# Patient Record
Sex: FEMALE | Race: OTHER | Employment: FULL TIME | URBAN - METROPOLITAN AREA
[De-identification: names, ages, dates, MRNs, and addresses within clinical notes are randomized per-mention and may not be internally consistent; named-entity substitution may affect disease eponyms.]

---

## 2021-02-24 ENCOUNTER — APPOINTMENT (OUTPATIENT)
Dept: PHYSICAL THERAPY | Age: 41
End: 2021-02-24
Payer: OTHER GOVERNMENT

## 2021-02-24 ENCOUNTER — HOSPITAL ENCOUNTER (OUTPATIENT)
Dept: PHYSICAL THERAPY | Age: 41
Discharge: HOME | End: 2021-02-24
Payer: OTHER GOVERNMENT

## 2021-02-24 PROCEDURE — 97530 THERAPEUTIC ACTIVITIES: CPT

## 2021-02-24 PROCEDURE — 97110 THERAPEUTIC EXERCISES: CPT

## 2021-02-24 PROCEDURE — 97161 PT EVAL LOW COMPLEX 20 MIN: CPT

## 2021-02-24 NOTE — PROGRESS NOTES
[facility]  [address]  [place], [address]  [phone] fax    Plan of Care/ Statement of Necessity for Physical Therapy Services    Patient name: Hung Lopez Start of Care: 2021   Referral source: Alan Hoang NP : 1980    Medical Diagnosis: Low back pain [M54.5]  Payor: ARON / Plan: Holger Gibson / Product Type: Sendy Cambric /  Onset Date:2021    Treatment Diagnosis: low back pain    Prior Hospitalization: see medical history Provider#: [id number]   Medications: Verified on Patient summary List    Comorbidities: allergies, back pain    Prior Level of Function: no limitations, working out on bike 3x/week      The Plan of Care and following information is based on the information from the initial evaluation. Assessment/ key information: Patient is a 47year old female who injured her low back (left > right) when lifting a box with poor mechanics on 2021. She reports no limitations prior to onset of injury and working out on her bike ~3x/week. Patient currently limited with prolonged positioning (sitting > standing) impacting work and household activities, reports difficulty with sleeping, and is fearful with performance of usual activities in fear of flaring her back up. Patient demonstrates decreased single leg stance balance, decreased left hip strength, decreased flexibility through low back (left > right), limited lifting mechanics, and decreased core stabilization impacting functional mobility. Patient would benefit from skilled PT 2x/week for 4 weeks to address above impairments and promote return to prior level of function.      Evaluation Complexity History MEDIUM  Complexity : 1-2 comorbidities / personal factors will impact the outcome/ POC ; Examination MEDIUM Complexity : 3 Standardized tests and measures addressing body structure, function, activity limitation and / or participation in recreation ;Presentation LOW Complexity : Stable, uncomplicated  ;Clinical Decision Making MEDIUM Complexity : FOTO score of 26-74  Overall Complexity Rating: LOW   Problem List: pain affecting function, decrease ROM, decrease strength, impaired gait/ balance, decrease ADL/ functional abilitiies, decrease activity tolerance, decrease flexibility/ joint mobility and decrease transfer abilities   Treatment Plan may include any combination of the following: Therapeutic exercise, Therapeutic activities, Neuromuscular re-education, Physical agent/modality, Gait/balance training, Manual therapy, Patient education, Self Care training, Functional mobility training, Home safety training and Stair training  Patient / Family readiness to learn indicated by: asking questions, trying to perform skills and interest  Persons(s) to be included in education: patient (P)  Barriers to Learning/Limitations: None  Patient Goal (s): “feel 100% again and not feel fearful of movement”  Patient Self Reported Health Status: good  Rehabilitation Potential: good    Short Term Goals: To be accomplished in 2 weeks:  1. Patient will report performance of home exercise program 4 of 7 days in the next week demonstrating compliance to therapy program and progress towards independent management of symptoms.  2. Patient will report no hesitations with transfers or quick change in positions associated with work or daily activities to improve functional mobility.     Long Term Goals: To be accomplished in 4 weeks:  1. Patient will demonstrate proper lifting mechanics without VC or hesitation for a 20# box to return to prior level of function and full work duties.   2. Patient will report at least 75% improvement in overall symptoms to improve overall quality of life and allow patient to return to recreational activities.   3. Patient will increase left hip strength globally to 5/5 for improved stair negation, improved functional mobility, and improved ability to  return to exercise program.   4. Patient will improve FOTO score to 74 to demonstrate return to prior level of function and to improve patients ability to perform household and work duties with greater ease. Frequency / Duration: Patient to be seen 1-2 times per week for 4 weeks. Patient/ Caregiver education and instruction: Diagnosis, prognosis, self care, activity modification, exercises and other hot/cold pack application for symptom management   [x]  Plan of care has been reviewed with ABDIRAHMAN Vaca PT, DPT 2/24/2021 12:56 PM    ________________________________________________________________________    I certify that the above Therapy Services are being furnished while the patient is under my care. I agree with the treatment plan and certify that this therapy is necessary.     [de-identified] Signature:____________Date:_________TIME:________     [facility], Not On File, MD  ** Signature, Date and Time must be completed for valid certification **    Please sign and return to [facility] [address]  [address]  [place], [address]  [phone] fax

## 2021-02-24 NOTE — PROGRESS NOTES
PT DAILY TREATMENT NOTE     Patient Name: Mert Christian  Date:2021  : 1980  [x]  Patient  Verified  Payor: ARON / Plan: Be Bustam[address] / Product Type: Berl Milbridge /    In time:1203  Out time:1241  Total Treatment Time (min):38  Visit #: 1 of -8     Treatment Area: Low back pain [M54.5]    SUBJECTIVE  Pain Level (0-10 scale): 1-2  Any medication changes, allergies to medications, adverse drug reactions, diagnosis change, or new procedure performed?: [x] No    [] Yes (see summary sheet for update)  Subjective functional status/changes:   [] No changes reported  Patient reports lifting a box of food on 2021 that she knew wasn't heavy so she bent over and lifted with her back. She reports immediate discomfort in the low back with severe pain onset by that evening. The following 2 days she had difficulty getting in/out of bed and had severe pain. She reports some improvements since onset with use of medication, ice, and frequent change in positions. Patient reports no limitations prior to onset of injury in February. OBJECTIVE    13 min [x]Eval                  []Re-Eval       15 min Therapeutic Exercise:  [x] See flow sheet: Patient provided printed HEP to begin addressing impairments. Patient provided demonstration of exercises and rationale for each exercise to improve compliance to HEP. Education provided on importance of compliance to HEP for improved carry over between therapy session. Rationale: increase ROM, increase strength and improve coordination to improve the patients ability to perform ADLs and functional mobility with greater ease     10 min Therapeutic Activity:  [x]  See flow sheet: Discussed pathology, findings, and treatment plan of care. Education provided on use of log roll for improved bed mobility to minimize discomfort on low back. Patient education provided on use of hot/cold pack application for symptoms  management.  Education also provided on proper lifting techniques/mechanics to minimize undue distress on low back. Rationale: improve coordination  to improve the patients ability to perform functional mobility with greater ease and improve sleep tolerance            With   [] TE   [] TA   [] neuro   [] other: Patient Education: [x] Review HEP    [] Progressed/Changed HEP based on:   [] positioning   [] body mechanics   [] transfers   [] heat/ice application    [] other:      Other Objective/Functional Measures: see paper chart for evaluation form     Pain Level (0-10 scale) post treatment: 1-2    ASSESSMENT/Changes in Function: Patient is a 47year old female who injured her low back (left > right) when lifting a box with poor mechanics on 2/4/2021. She reports no limitations prior to onset of injury and working out on her bike ~3x/week. Patient currently limited with prolonged positioning (sitting > standing) impacting work and household activities, reports difficulty with sleeping, and is fearful with performance of usual activities in fear of flaring her back up. Patient demonstrates decreased single leg stance balance, decreased left hip strength, decreased flexibility through low back (left > right), limited lifting mechanics, and decreased core stabilization impacting functional mobility. Patient would benefit from skilled PT 2x/week for 4 weeks to address above impairments and promote return to prior level of function. Patient will continue to benefit from skilled PT services to modify and progress therapeutic interventions, address functional mobility deficits, address ROM deficits, address strength deficits, analyze and address soft tissue restrictions, analyze and cue movement patterns, analyze and modify body mechanics/ergonomics, assess and modify postural abnormalities, address imbalance/dizziness and instruct in home and community integration to attain remaining goals.      [x]  See Plan of Care  []  See progress note/recertification  [] See Discharge Summary         Progress towards goals / Updated goals:  Short Term Goals: To be accomplished in 2 weeks:  1. Patient will report performance of home exercise program 4 of 7 days in the next week demonstrating compliance to therapy program and progress towards independent management of symptoms. Eval: HEP provided   2. Patient will report no hesitations with transfers or quick change in positions associated with work or daily activities to improve functional mobility. Eval: hesitates with transfers and fearful of movement    Long Term Goals: To be accomplished in 4 weeks:  1. Patient will demonstrate proper lifting mechanics without VC or hesitation for a 20# box to return to prior level of function and full work duties. Eval: 15# box with mini squat and lifting with back slightly   2. Patient will report at least 75% improvement in overall symptoms to improve overall quality of life and allow patient to return to recreational activities. Eval: 0%   3. Patient will increase left hip strength globally to 5/5 for improved stair negation, improved functional mobility, and improved ability to return to exercise program.    Eval: left hip flexion strength 4+/5, left hip abduction strength 4+/5, left hip extension strength 3+/5 with pain   4. Patient will improve FOTO score to 74 to demonstrate return to prior level of function and to improve patients ability to perform household and work duties with greater ease.    Eval: 64    PLAN  [x]  Upgrade activities as tolerated     []  Continue plan of care  []  Update interventions per flow sheet       []  Discharge due to:_  []  Other:_      Chris Conner, PT, DPT 2/24/2021  12:56 PM    Future Appointments   Date Time Provider Melvin Steiner   3/2/2021 12:00 PM [facility]   3/4/2021  1:00 PM [facility]in[facility]   3/9/2021 12:00 PM [facility]ba[facility]   3/11/2021  1:00 PM Cresencio Rod C, PTA [facility]   3/16/2021 12:00 PM Varghese Leon, PTA [facility]   3/18/2021  1:00 PM Mary Jo Davis, PTA [facility]   3/23/2021 12:00 PM Julián Ricketts [facility]

## 2021-03-02 ENCOUNTER — HOSPITAL ENCOUNTER (OUTPATIENT)
Dept: PHYSICAL THERAPY | Age: 41
Discharge: HOME | End: 2021-03-02
Payer: OTHER GOVERNMENT

## 2021-03-02 PROCEDURE — 97110 THERAPEUTIC EXERCISES: CPT

## 2021-03-02 PROCEDURE — 97112 NEUROMUSCULAR REEDUCATION: CPT

## 2021-03-02 PROCEDURE — 97140 MANUAL THERAPY 1/> REGIONS: CPT

## 2021-03-02 NOTE — PROGRESS NOTES
PT DAILY TREATMENT NOTE     Patient Name: Nabila Joshi  Date:3/2/2021  : 1980  [x]  Patient  Verified  Payor:  / Plan: [facility]  [facility]ION / Product Type:  /    In time:11:56  Out time:12:49  Total Treatment Time (min): 48  Visit #: 2 of 4-8    Treatment Area: Low back pain [M54.5]    SUBJECTIVE  Pain Level (0-10 scale): 2/10  Any medication changes, allergies to medications, adverse drug reactions, diagnosis change, or new procedure performed?: [x] No    [] Yes (see summary sheet for update)  Subjective functional status/changes:   [] No changes reported  \"Been feeling better. I can tell I'm not 100% while I'm doing the stretches. \"    OBJECTIVE    20 min Therapeutic Exercise:  [x] See flow sheet :   Rationale: increase ROM and increase strength to improve the patients ability to perform ADL's.     25 min Neuromuscular Re-education:  [x]  See flow sheet : RDL's with TRX, reformer series, CoreAlign series, shuttle balance squats. Rationale: increase strength, improve coordination and increase proprioception  to improve the patients ability to perform functional activities. 8 min Manual Therapy:  T/S PA mobs, rib springs grade III. STM/DTM Left QL, L/S paraspinals and piriformis. MFR Left low back musculature. Pt prone. The manual therapy interventions were performed at a separate and distinct time from the therapeutic activities interventions. Rationale: decrease pain, increase ROM, increase tissue extensibility and decrease trigger points to improve ease of performing functional activities. With   [x] TE   [] TA   [] neuro   [] other: Patient Education: [x] Review HEP    [] Progressed/Changed HEP based on:   [] positioning   [] body mechanics   [] transfers   [] heat/ice application    [] other:      Other Objective/Functional Measures: Initiated exercises per flow sheet. Cueing and demonstration for proper exercise mechanics.  2 Cavitations with PA mobs and rib mitesh. Pain Level (0-10 scale) post treatment: 0/10    ASSESSMENT/Changes in Function: First F/U visit. Pt fully participated in treatment. Left LE weaker than Right, pt reports history of Left knee pain. Pt denied pain low back, expressed feeling fatigued/tired post-treatment. Continue PT to increase core and (B) hip strength to improve ease of performing functional activities. Patient will continue to benefit from skilled PT services to modify and progress therapeutic interventions, address functional mobility deficits, address ROM deficits, address strength deficits, analyze and address soft tissue restrictions, analyze and cue movement patterns and analyze and modify body mechanics/ergonomics to attain remaining goals. [x]  See Plan of Care  []  See progress note/recertification  []  See Discharge Summary         Progress towards goals / Updated goals:  Short Term Goals: To be accomplished in 2 weeks:  1. Patient will report performance of home exercise program 4 of 7 days in the next week demonstrating compliance to therapy program and progress towards independent management of symptoms. Eval: HEP provided   Current: Met, reports compliance. 3/2/2021  2. Patient will report no hesitations with transfers or quick change in positions associated with work or daily activities to improve functional mobility. Eval: hesitates with transfers and fearful of movement     Long Term Goals: To be accomplished in 4 weeks:  1. Patient will demonstrate proper lifting mechanics without VC or hesitation for a 20# box to return to prior level of function and full work duties. Eval: 15# box with mini squat and lifting with back slightly   2. Patient will report at least 75% improvement in overall symptoms to improve overall quality of life and allow patient to return to recreational activities. Eval: 0%   3.  Patient will increase left hip strength globally to 5/5 for improved stair negation, improved functional mobility, and improved ability to return to exercise program.               Eval: left hip flexion strength 4+/5, left hip abduction strength 4+/5, left hip extension strength 3+/5 with pain   4. Patient will improve FOTO score to 74 to demonstrate return to prior level of function and to improve patients ability to perform household and work duties with greater ease.               Eval: 56    PLAN  []  Upgrade activities as tolerated     [x]  Continue plan of care  []  Update interventions per flow sheet       []  Discharge due to:_  []  Other:_      Antonio Sullivan PTA 3/2/2021  12:07 PM    Future Appointments   Date Time Provider Melvin Steiner   3/4/2021  1:00 PM Magaly Butler PTA [facility]   3/9/2021 12:00 PM Magaly Butler PTA [facility]   3/11/2021  1:00 PM Jimbo Quesada PTA [facility]   3/16/2021 12:00 PM Jimbo Quesada PTA [facility]   3/18/2021  1:00 PM Magaly Butler PTA [facility]   3/23/2021 12:00 PM Inga Ambrose [facility]

## 2021-03-04 ENCOUNTER — HOSPITAL ENCOUNTER (OUTPATIENT)
Dept: PHYSICAL THERAPY | Age: 41
Discharge: HOME | End: 2021-03-04
Payer: OTHER GOVERNMENT

## 2021-03-04 PROCEDURE — 97110 THERAPEUTIC EXERCISES: CPT

## 2021-03-04 PROCEDURE — 97140 MANUAL THERAPY 1/> REGIONS: CPT

## 2021-03-04 PROCEDURE — 97112 NEUROMUSCULAR REEDUCATION: CPT

## 2021-03-04 NOTE — PROGRESS NOTES
PT DAILY TREATMENT NOTE     Patient Name: Arash Head  Date:3/4/2021  : 1980  [x]  Patient  Verified  Payor:  / Plan: [facility] / Product Type:  /    In time:1:00  Out time:1:57  Total Treatment Time (min): 62  Visit #: 3 of 4-8      Treatment Area: Low back pain [M54.5]    SUBJECTIVE  Pain Level (0-10 scale): 0  Any medication changes, allergies to medications, adverse drug reactions, diagnosis change, or new procedure performed?: [x] No    [] Yes (see summary sheet for update)  Subjective functional status/changes:   [] No changes reported  Pt reports she has no pain in her low back today but is a little sore from her last session. OBJECTIVE    Modality rationale: decrease pain and increase tissue extensibility to improve the patients ability to perform daily task with ease.    Min Type Additional Details    [] Estim:  []Unatt       []IFC  []Premod                        []Other:  []w/ice   []w/heat  Position:  Location:    [] Estim: []Att    []TENS instruct  []NMES                    []Other:  []w/US   []w/ice   []w/heat  Position:  Location:    []  Traction: [] Cervical       []Lumbar                       [] Prone          []Supine                       []Intermittent   []Continuous Lbs:  [] before manual  [] after manual    []  Ultrasound: []Continuous   [] Pulsed                           []1MHz   []3MHz W/cm2:  Location:    []  Iontophoresis with dexamethasone         Location: [] Take home patch   [] In clinic   10 []  Ice     [x]  heat  []  Ice massage  []  Laser   []  Anodyne Position: supine w/wedge  Location:    []  Laser with stim  []  Other:  Position:  Location:    []  Vasopneumatic Device Pressure:       [] lo [] med [] hi   Temperature: [] lo [] med [] hi   [] Skin assessment post-treatment:  []intact []redness- no adverse reaction    []redness  adverse reaction:       15 min Therapeutic Exercise:  [x] See flow sheet :   Rationale: increase ROM, increase strength and improve coordination to improve the patients ability to perform daily task with ease. 24 min Neuromuscular Re-education:  [x]  See flow sheet : core align, shuttle balance   Rationale: increase strength, improve coordination, improve balance and increase proprioception  to improve the patients ability to perform functional task with ease. 8 min Manual Therapy:  T/S PA mobs, rib springs grade III. STM/DTM Left QL, L/S paraspinals and piriformis. MFR Left low back musculature. Pt prone. The manual therapy interventions were performed at a separate and distinct time from the therapeutic activities interventions. Rationale: decrease pain, increase ROM, increase tissue extensibility and decrease trigger points to perform            With   [] TE   [] TA   [] neuro   [] other: Patient Education: [x] Review HEP    [] Progressed/Changed HEP based on:   [] positioning   [] body mechanics   [] transfers   [] heat/ice application    [] other:      Other Objective/Functional Measures:      Pain Level (0-10 scale) post treatment: 0    ASSESSMENT/Changes in Function:   Improved RDL mechanics with the TRX bands after correction however pt does start to regress in form with fatigue. Increased lordosis noted throughout exercises with pt noting a minor discomfort in the low back that prompted an emphasis on PPT mechanics in supine and in standing to help decrease lordosis with exercises. Pt notes improved comfort in the L/S with PPT while performing standing exercises especially noted with exercises on the core align. Pt reports good relief with manual and notes no pain post treatment.      Patient will continue to benefit from skilled PT services to modify and progress therapeutic interventions, address functional mobility deficits, address ROM deficits, address strength deficits, analyze and address soft tissue restrictions, analyze and cue movement patterns, analyze and modify body mechanics/ergonomics and assess and modify postural abnormalities to attain remaining goals. [x]  See Plan of Care  []  See progress note/recertification  []  See Discharge Summary         Progress towards goals / Updated goals:  Short Term Goals: To be accomplished in 2 weeks:  1. Patient will report performance of home exercise program 4 of 7 days in the next week demonstrating compliance to therapy program and progress towards independent management of symptoms.             Eval: HEP provided              Current: Met, reports compliance. 3/2/2021  2. Patient will report no hesitations with transfers or quick change in positions associated with work or daily activities to improve functional mobility.               Eval: hesitates with transfers and fearful of movement     Long Term Goals: To be accomplished in 4 weeks:  1. Patient will demonstrate proper lifting mechanics without VC or hesitation for a 20# box to return to prior level of function and full work duties.                Eval: 15# box with mini squat and lifting with back slightly   2. Patient will report at least 75% improvement in overall symptoms to improve overall quality of life and allow patient to return to recreational activities.                Eval: 0%   3. Patient will increase left hip strength globally to 5/5 for improved stair negation, improved functional mobility, and improved ability to return to exercise program.               Eval: left hip flexion strength 4+/5, left hip abduction strength 4+/5, left hip extension strength 3+/5 with pain   4.  Patient will improve FOTO score to 74 to demonstrate return to prior level of function and to improve patients ability to perform household and work duties with greater ease.              Eval: 56    PLAN  []  Upgrade activities as tolerated     [x]  Continue plan of care  []  Update interventions per flow sheet       []  Discharge due to:_  []  Other:_      Sarah Herzog, PTA 3/4/2021  12:55 PM    Future Appointments   Date Time Provider Melvin Steiner   3/4/2021  1:00 PM Lisa Dhillon, ABDIRAHMAN [facility]   3/9/2021 12:00 PM Lisa Dhillon, PTA [facility]   3/11/2021  1:00 PM Kitty Hernandez, PTA [facility]   3/16/2021 12:00 PM Kitty Hernandez, PTA [facility]   3/18/2021  1:00 PM Lisa Dhillon, PTA [facility]   3/23/2021 12:00 PM Leticia Flores [facility]

## 2021-03-09 ENCOUNTER — HOSPITAL ENCOUNTER (OUTPATIENT)
Dept: PHYSICAL THERAPY | Age: 41
Discharge: HOME | End: 2021-03-09
Payer: OTHER GOVERNMENT

## 2021-03-09 PROCEDURE — 97110 THERAPEUTIC EXERCISES: CPT

## 2021-03-09 PROCEDURE — 97112 NEUROMUSCULAR REEDUCATION: CPT

## 2021-03-09 PROCEDURE — 97140 MANUAL THERAPY 1/> REGIONS: CPT

## 2021-03-09 NOTE — PROGRESS NOTES
PT DAILY TREATMENT NOTE     Patient Name: Candelaria Zhou  Date:3/9/2021  : 1980  [x]  Patient  Verified  Payor:  / Plan: [facility] REGION / Product Type:  /    In time:12:01  Out time:1:46  Total Treatment Time (min): 45  Visit #: 4 of 8    Medicare/BCBS Only   Total Timed Codes (min):  45 1:1 Treatment Time:  45       Treatment Area: Low back pain [M54.5]    SUBJECTIVE  Pain Level (0-10 scale): 0/10  Any medication changes, allergies to medications, adverse drug reactions, diagnosis change, or new procedure performed?: [x] No    [] Yes (see summary sheet for update)  Subjective functional status/changes:   [] No changes reported  The patient denies pain upon arrival and states she feels like she has been babying her back. OBJECTIVE  22 min Therapeutic Exercise:  [x] See flow sheet :   Rationale: increase ROM and increase strength to improve the patients ability to improve ADL ease. 15 min Neuromuscular Re-education:  []  See flow sheet :   Rationale: increase ROM and increase strength  to improve the patients ability to improve ADL ease. 8 min Manual Therapy:  MFR of left QL and lumbar paraspinal with the patient in prone. The manual therapy interventions were performed at a separate and distinct time from the therapeutic activities interventions. Rationale: decrease pain, increase ROM and increase tissue extensibility to improve ADL ease. With   [] TE   [] TA   [] neuro   [] other: Patient Education: [x] Review HEP    [] Progressed/Changed HEP based on:   [] positioning   [] body mechanics   [] transfers   [] heat/ice application    [] other:      Other Objective/Functional Measures:   KB squat lifts 10# with good form x 12 to floor, but elevated with 4\" box. Pain Level (0-10 scale) post treatment: 3/10    ASSESSMENT/Changes in Function: The patient was able to complete exercises. She was challenged by progressions;  Able to maintain spinal neutral through interventions and left at a 3/10, but stated her back felt \"tired, but it's good, I know I need it. \"     Patient will continue to benefit from skilled PT services to modify and progress therapeutic interventions, address functional mobility deficits, address ROM deficits, address strength deficits, analyze and address soft tissue restrictions, analyze and cue movement patterns, analyze and modify body mechanics/ergonomics, assess and modify postural abnormalities and instruct in home and community integration to attain remaining goals. []  See Plan of Care  []  See progress note/recertification  []  See Discharge Summary         Progress towards goals / Updated goals:  Short Term Goals: To be accomplished in 2 weeks:  1. Patient will report performance of home exercise program 4 of 7 days in the next week demonstrating compliance to therapy program and progress towards independent management of symptoms.             Eval: HEP provided              IIPQNXU: Met, reports compliance. 3/2/2021  2. Patient will report no hesitations with transfers or quick change in positions associated with work or daily activities to improve functional mobility.               Eval: hesitates with transfers and fearful of movement     Long Term Goals: To be accomplished in 4 weeks:  1. Patient will demonstrate proper lifting mechanics without VC or hesitation for a 20# box to return to prior level of function and full work duties.                Eval: 15# box with mini squat and lifting with back slightly    Current: 15\" from floor on 4\" step 3/09/2021  2. Patient will report at least 75% improvement in overall symptoms to improve overall quality of life and allow patient to return to recreational activities.                Eval: 0%   3.  Patient will increase left hip strength globally to 5/5 for improved stair negation, improved functional mobility, and improved ability to return to exercise program.               Eval: left hip flexion strength 4+/5, left hip abduction strength 4+/5, left hip extension strength 3+/5 with pain   4.  Patient will improve FOTO score to 74 to demonstrate return to prior level of function and to improve patients ability to perform household and work duties with greater ease.              Eval: 56    PLAN  []  Upgrade activities as tolerated     [x]  Continue plan of care  []  Update interventions per flow sheet       []  Discharge due to:_  []  Other:_       Rajendra Jesus, PT 3/9/2021  12:19 PM    Future Appointments   Date Time Provider Melvin Steiner   3/11/2021  1:00 PM Jacquelin Dave PTA [facility]   3/16/2021 12:00 PM Jacquelin Dave PTA [facility]   3/18/2021  1:00 PM Tee Trujillo PTA [facility]   3/23/2021 12:00 PM Mark Brandon [facility]

## 2021-03-11 ENCOUNTER — HOSPITAL ENCOUNTER (OUTPATIENT)
Dept: PHYSICAL THERAPY | Age: 41
Discharge: HOME | End: 2021-03-11
Payer: OTHER GOVERNMENT

## 2021-03-11 PROCEDURE — 97140 MANUAL THERAPY 1/> REGIONS: CPT

## 2021-03-11 PROCEDURE — 97110 THERAPEUTIC EXERCISES: CPT

## 2021-03-11 PROCEDURE — 97112 NEUROMUSCULAR REEDUCATION: CPT

## 2021-03-11 NOTE — PROGRESS NOTES
PT DAILY TREATMENT NOTE     Patient Name: Mari Azul  Date:3/11/2021  : 1980  [x]  Patient  Verified  Payor:  / Plan: [facility] REGION / Product Type:  /    In time:12:58  Out time:1:55  Total Treatment Time (min): 62  Visit #: 5 of 8    Treatment Area: Low back pain [M54.5]    SUBJECTIVE  Pain Level (0-10 scale): 2/10  Any medication changes, allergies to medications, adverse drug reactions, diagnosis change, or new procedure performed?: [x] No    [] Yes (see summary sheet for update)  Subjective functional status/changes:   [] No changes reported  \"Just a little sore but no pain. \"    OBJECTIVE    Modality rationale: decrease pain and increase tissue extensibility to improve the patients ability to perform ADL's.    Min Type Additional Details    [] Estim:  []Unatt       []IFC  []Premod                        []Other:  []w/ice   []w/heat  Position:  Location:    [] Estim: []Att    []TENS instruct  []NMES                    []Other:  []w/US   []w/ice   []w/heat  Position:  Location:    []  Traction: [] Cervical       []Lumbar                       [] Prone          []Supine                       []Intermittent   []Continuous Lbs:  [] before manual  [] after manual    []  Ultrasound: []Continuous   [] Pulsed                           []1MHz   []3MHz W/cm2:  Location:    []  Iontophoresis with dexamethasone         Location: [] Take home patch   [] In clinic   10 []  Ice     [x]  heat  []  Ice massage  []  Laser   []  Anodyne Position: Supine with wedge  Location: L/S    []  Laser with stim  []  Other:  Position:  Location:    []  Vasopneumatic Device Pressure:       [] lo [] med [] hi   Temperature: [] lo [] med [] hi   [] Skin assessment post-treatment:  []intact []redness- no adverse reaction    []redness  adverse reaction:     10 min Therapeutic Exercise:  [x] See flow sheet :   Rationale: increase ROM and increase strength to improve the patients ability to perform ADL's.    29 min Neuromuscular Re-education:  [x]  See flow sheet : RDL's with TRX, reformer series, CoreAlign series, shuttle balance squats. Rationale: increase strength, improve coordination and increase proprioception  to improve the patients ability to perform functional activities. 8 min Manual Therapy:  T/S PA mobs, rib springs grade III. STM/DTM Left QL, L/S paraspinals and piriformis. MFR Left low back musculature. Pt prone. The manual therapy interventions were performed at a separate and distinct time from the therapeutic activities interventions. Rationale: decrease pain, increase ROM, increase tissue extensibility and decrease trigger points to improve ease of performing functional activities. With   [x] TE   [] TA   [] neuro   [] other: Patient Education: [x] Review HEP    [] Progressed/Changed HEP based on:   [] positioning   [] body mechanics   [] transfers   [] heat/ice application    [] other:      Other Objective/Functional Measures: Cueing for exercise mechanics to decrease strain/pressure in low back. Pain Level (0-10 scale) post treatment: 0/10    ASSESSMENT/Changes in Function: Pt fully participated in treatment and corrected mechanics after VC's and demonstration. Denied pain however expressed (B) LE and low back fatigue. Good mechanics with 20# KB lift, performed 10 reps. Continue PT to further increase core strength/stability to improve ease of performing daily tasks. Patient will continue to benefit from skilled PT services to modify and progress therapeutic interventions, address functional mobility deficits, address ROM deficits, address strength deficits, analyze and address soft tissue restrictions and analyze and modify body mechanics/ergonomics to attain remaining goals.      [x]  See Plan of Care  []  See progress note/recertification  []  See Discharge Summary         Progress towards goals / Updated goals:  Short Term Goals: To be accomplished in 2 weeks: 1. Patient will report performance of home exercise program 4 of 7 days in the next week demonstrating compliance to therapy program and progress towards independent management of symptoms.             Eval: HEP provided              [id number]: Met, reports compliance. 3/2/2021  2. Patient will report no hesitations with transfers or quick change in positions associated with work or daily activities to improve functional mobility.               Eval: hesitates with transfers and fearful of movement     Long Term Goals: To be accomplished in 4 weeks:  1. Patient will demonstrate proper lifting mechanics without VC or hesitation for a 20# box to return to prior level of function and full work duties.                Eval: 15# box with mini squat and lifting with back slightly               Current: 15\" from floor on 4\" step 3/09/2021  2. Patient will report at least 75% improvement in overall symptoms to improve overall quality of life and allow patient to return to recreational activities.                Eval: 0%   3. Patient will increase left hip strength globally to 5/5 for improved stair negation, improved functional mobility, and improved ability to return to exercise program.               Eval: left hip flexion strength 4+/5, left hip abduction strength 4+/5, left hip extension strength 3+/5 with pain   4.  Patient will improve FOTO score to 74 to demonstrate return to prior level of function and to improve patients ability to perform household and work duties with greater ease.              Eval: 56    PLAN  []  Upgrade activities as tolerated     [x]  Continue plan of care  []  Update interventions per flow sheet       []  Discharge due to:_  []  Other:_      Nadira Collazo PTA 3/11/2021  12:59 PM    Future Appointments   Date Time Provider Melvin Steiner   3/11/2021  1:00 PM [facility]   3/16/2021 12:00 PM [facility]   3/18/2021  1:00 PM [facility]   3/23/2021 12:00 PM [facility]

## 2021-03-16 ENCOUNTER — HOSPITAL ENCOUNTER (OUTPATIENT)
Dept: PHYSICAL THERAPY | Age: 41
Discharge: HOME | End: 2021-03-16
Payer: OTHER GOVERNMENT

## 2021-03-16 PROCEDURE — 97140 MANUAL THERAPY 1/> REGIONS: CPT

## 2021-03-16 PROCEDURE — 97112 NEUROMUSCULAR REEDUCATION: CPT

## 2021-03-16 PROCEDURE — 97110 THERAPEUTIC EXERCISES: CPT

## 2021-03-16 NOTE — PROGRESS NOTES
PT DAILY TREATMENT NOTE     Patient Name: Ritu Davis  Date:3/16/2021  : 1980  [x]  Patient  Verified  Payor:  / Plan: [facility]  [facility] REGION / Product Type:  /    In time:12:03  Out time:12:46  Total Treatment Time (min): 43  Visit #: 6 of 8    Treatment Area: Low back pain [M54.5]    SUBJECTIVE  Pain Level (0-10 scale): 0/10  Any medication changes, allergies to medications, adverse drug reactions, diagnosis change, or new procedure performed?: [x] No    [] Yes (see summary sheet for update)  Subjective functional status/changes:   [] No changes reported  \"Comes and goes, feels like my spine. \"    OBJECTIVE    10 min Therapeutic Exercise:  [x] See flow sheet :   Rationale: increase ROM and increase strength to improve the patients ability to perform ADL's.    25 min Neuromuscular Re-education:  [x]  See flow sheet : reformer series, CoreAlign series, shuttle balance squats, forward BOSU step ups, band-walks. Rationale: increase strength, improve coordination and increase proprioception  to improve the patients ability to perform functional activities. 8 min Manual Therapy:  Graston technique to Left QL, lower T/S paraspinals, L/S paraspinals. MFR Left low back musculature. Pt prone. The manual therapy interventions were performed at a separate and distinct time from the therapeutic activities interventions. Rationale: decrease pain, increase ROM, increase tissue extensibility and decrease trigger points to improve ease of performing functional activities. With   [x] TE   [] TA   [] neuro   [] other: Patient Education: [x] Review HEP    [] Progressed/Changed HEP based on:   [] positioning   [] body mechanics   [] transfers   [] heat/ice application    [] other:      Other Objective/Functional Measures: Reports Left knee provocation with BOSU step ups, held lateral step ups today. Trial Graston technique.      Pain Level (0-10 scale) post treatment: 0/10    ASSESSMENT/Changes in Function: Pt reports ~50% overall subjective improvement since IE. Also reports continued guarding with transfers or quick change in positions. Pt hesitates secondary to feeling like she will aggravate symptoms however she reports improved ease with daily tasks. Patient will continue to benefit from skilled PT services to modify and progress therapeutic interventions, address functional mobility deficits, address ROM deficits, address strength deficits, analyze and address soft tissue restrictions and analyze and modify body mechanics/ergonomics to attain remaining goals. [x]  See Plan of Care  []  See progress note/recertification  []  See Discharge Summary         Progress towards goals / Updated goals:  Short Term Goals: To be accomplished in 2 weeks:  1. Patient will report performance of home exercise program 4 of 7 days in the next week demonstrating compliance to therapy program and progress towards independent management of symptoms.             Eval: HEP provided              XMTCMNJ: Met, reports compliance. 3/2/2021  2. Patient will report no hesitations with transfers or quick change in positions associated with work or daily activities to improve functional mobility.               Eval: hesitates with transfers and fearful of movement   Current: Continued guarding with transfers or quick change in positions. Pt hesitates secondary to feeling like she will aggravate symptoms. 3/16/2021   Long Term Goals: To be accomplished in 4 weeks:  1. Patient will demonstrate proper lifting mechanics without VC or hesitation for a 20# box to return to prior level of function and full work duties.                Eval: 15# box with mini squat and lifting with back slightly               Current: 15\" from floor on 4\" step 3/09/2021  2.  Patient will report at least 75% improvement in overall symptoms to improve overall quality of life and allow patient to return to recreational activities.                Eval: 0%   Current: Progressing, ~50%. 3/16/2021   3. Patient will increase left hip strength globally to 5/5 for improved stair negation, improved functional mobility, and improved ability to return to exercise program.               Eval: left hip flexion strength 4+/5, left hip abduction strength 4+/5, left hip extension strength 3+/5 with pain   4.  Patient will improve FOTO score to 74 to demonstrate return to prior level of function and to improve patients ability to perform household and work duties with greater ease.              Eval: 56    PLAN  []  Upgrade activities as tolerated     [x]  Continue plan of care  []  Update interventions per flow sheet       []  Discharge due to:_  []  Other:_      Sonja Arreola PTA 3/16/2021  12:05 PM    Future Appointments   Date Time Provider Melvin Steiner   3/18/2021  1:00 PM Sue Christy PTA [facility]   3/23/2021 12:00 PM [facility]

## 2021-03-18 ENCOUNTER — HOSPITAL ENCOUNTER (OUTPATIENT)
Dept: PHYSICAL THERAPY | Age: 41
Discharge: HOME | End: 2021-03-18
Payer: OTHER GOVERNMENT

## 2021-03-18 PROCEDURE — 97112 NEUROMUSCULAR REEDUCATION: CPT

## 2021-03-18 PROCEDURE — 97110 THERAPEUTIC EXERCISES: CPT

## 2021-03-18 PROCEDURE — 97140 MANUAL THERAPY 1/> REGIONS: CPT

## 2021-03-18 NOTE — PROGRESS NOTES
PT DAILY TREATMENT NOTE     Patient Name: Isak Vann  Date:3/18/2021  : 1980  [x]  Patient  Verified  Payor:  / Plan: [facility]  [facility] REGION / Product Type:  /    In time:1:01  Out time:1:47  Total Treatment Time (min): 46  Visit #: 7 of 8    Treatment Area: Low back pain [M54.5]    SUBJECTIVE  Pain Level (0-10 scale): 0  Any medication changes, allergies to medications, adverse drug reactions, diagnosis change, or new procedure performed?: [x] No    [] Yes (see summary sheet for update)  Subjective functional status/changes:   [] No changes reported  Pt reports her low back is feeling really good today. OBJECTIVE    26 min Therapeutic Exercise:  [x] See flow sheet :   Rationale: increase ROM, increase strength and improve coordination to improve the patients ability to perform functioanl task with ease. 12 min Neuromuscular Re-education:  [x]  See flow sheet : SL RDL, PPT with PB, Core align   Rationale: increase strength, improve coordination and increase proprioception  to improve the patients ability to perform ADL's with ease. 8 min Manual Therapy:  T/S PA mobs, rib springs grade III. STM/DTM Left QL, L/S paraspinals and piriformis. MFR Left low back musculature. Pt prone. The manual therapy interventions were performed at a separate and distinct time from the therapeutic activities interventions. Rationale: decrease pain, increase ROM, increase tissue extensibility and decrease trigger points to improve functional mobility with ADL's.           With   [] TE   [] TA   [] neuro   [] other: Patient Education: [x] Review HEP    [] Progressed/Changed HEP based on:   [] positioning   [] body mechanics   [] transfers   [] heat/ice application    [] other:      Other Objective/Functional Measures: Pt to prepare for D/C NV     Pain Level (0-10 scale) post treatment: 0    ASSESSMENT/Changes in Function:  Pt able to perform therex as prescribed with no complaints of increased L/s pain or discomfort. Pt displays improved core engagement abilities and notes ease with exercises as the core is more engaged. Pt continues to be challenged with SL RDL's on the Right LE displaying mild instability and difficulty maintain neutral hips on the right more than the left LE. Pt reports a significant decrease in L/s pain since [facility] and notes she feels confident enough to manage her symptoms when they arise. Pt to prepare for d/c NV. Patient will continue to benefit from skilled PT services to modify and progress therapeutic interventions, address functional mobility deficits, address ROM deficits, address strength deficits, analyze and address soft tissue restrictions, analyze and cue movement patterns, analyze and modify body mechanics/ergonomics and assess and modify postural abnormalities to attain remaining goals. [x]  See Plan of Care  []  See progress note/recertification  []  See Discharge Summary         Progress towards goals / Updated goals:  Short Term Goals: To be accomplished in 2 weeks:  1. Patient will report performance of home exercise program 4 of 7 days in the next week demonstrating compliance to therapy program and progress towards independent management of symptoms.             Eval: HEP provided              DTNIWTO: Met, reports compliance. 3/2/2021  2. Patient will report no hesitations with transfers or quick change in positions associated with work or daily activities to improve functional mobility.               Eval: hesitates with transfers and fearful of movement              Current: Continued guarding with transfers or quick change in positions. Pt hesitates secondary to feeling like she will aggravate symptoms. 3/16/2021   Long Term Goals: To be accomplished in 4 weeks:  1. Patient will demonstrate proper lifting mechanics without VC or hesitation for a 20# box to return to prior level of function and full work duties.                Eval: 15# box with mini squat and lifting with back slightly               Current: 15\" from floor on 4\" step 3/09/2021  2. Patient will report at least 75% improvement in overall symptoms to improve overall quality of life and allow patient to return to recreational activities.                Eval: 0%              Current: Progressing, ~50%. 3/16/2021   3. Patient will increase left hip strength globally to 5/5 for improved stair negation, improved functional mobility, and improved ability to return to exercise program.               Eval: left hip flexion strength 4+/5, left hip abduction strength 4+/5, left hip extension strength 3+/5 with pain    Current: Progressing 3/18/21 left hip flex 4+/5, Left hip abd 5/5, left hip ext 4+/5  4.  Patient will improve FOTO score to 74 to demonstrate return to prior level of function and to improve patients ability to perform household and work duties with greater ease.              Eval: 56    PLAN  []  Upgrade activities as tolerated     [x]  Continue plan of care  []  Update interventions per flow sheet       []  Discharge due to:_  []  Other:_      Saadia Ek, PTA 3/18/2021  1:03 PM    Future Appointments   Date Time Provider Melvin Steiner   3/23/2021 12:00 PM Kathia Har[facility]

## 2021-03-23 ENCOUNTER — HOSPITAL ENCOUNTER (OUTPATIENT)
Dept: PHYSICAL THERAPY | Age: 41
Discharge: HOME | End: 2021-03-23
Payer: OTHER GOVERNMENT

## 2021-03-23 PROCEDURE — 97110 THERAPEUTIC EXERCISES: CPT

## 2021-03-23 PROCEDURE — 97140 MANUAL THERAPY 1/> REGIONS: CPT

## 2021-03-23 NOTE — PROGRESS NOTES
[facility]  [address]  [place], [address]  [phone] fax    Physical Therapy Progress Note  Patient name: Rah Fonseca Start of Care: 2021   Referral source: Kate Harding NP : 1980   Medical/Treatment Diagnosis: Low back pain [M54.5]  Payor: ARON / Plan: Be Bustamante 74 / Product Type: Jossy Peterson /  Onset Date:2021     Prior Hospitalization: see medical history Provider#: [id number]   Medications: Verified on Patient Summary List    Comorbidities: allergies, back pain    Prior Level of Function: no limitations, working out on bike 3x/week   Visits from Start of Care: 8    Missed Visits: 0      Established Goals:          Short Term Goals: To be accomplished in 2 weeks:  1. Patient will report performance of home exercise program 4 of 7 days in the next week demonstrating compliance to therapy program and progress towards independent management of symptoms.             Eval: HEP provided              HRTOKSF: Met, reports compliance  2. Patient will report no hesitations with transfers or quick change in positions associated with work or daily activities to improve functional mobility.               Eval: hesitates with transfers and fearful of movement              Current: Continued guarding with transfers or quick change in positions. Pt hesitates secondary to feeling like she will aggravate symptoms.      Long Term Goals: To be accomplished in 4 weeks:  1. Patient will demonstrate proper lifting mechanics without VC or hesitation for a 20# box to return to prior level of function and full work duties.                Eval: 15# box with mini squat and lifting with back slightly               Current: 15\" from floor on 4\" step   2. Patient will report at least 75% improvement in overall symptoms to improve overall quality of life and allow patient to return to recreational activities.                Eval: 0%              Current: Progressing, ~50%.   3. Patient will increase left hip strength globally to 5/5 for improved stair negation, improved functional mobility, and improved ability to return to exercise program.               Eval: left hip flexion strength 4+/5, left hip abduction strength 4+/5, left hip extension strength 3+/5 with pain               Current: Progressing - left hip flex 4+/5, Left hip abd 5/5, left hip ext 4+/5  4. Patient will improve FOTO score to 74 to demonstrate return to prior level of function and to improve patients ability to perform household and work duties with greater ease.              Eval: 56   Current: Regressed - 42     Key Functional Changes: increased transfers, increased left hip strength, improved lifting mechanics     Updated Goals: to be achieved in 4 weeks:  1. Patient will demonstrate proper lifting mechanics without VC or hesitation for a 20# box to return to prior level of function and full work duties.                PN: 15\" from floor on 4\" step   2. Patient will report at least 75% improvement in overall symptoms to improve overall quality of life and allow patient to return to recreational activities.                PN: ~50%   3. Patient will increase left hip strength globally to 5/5 for improved stair negation, improved functional mobility, and improved ability to return to exercise program.               PN: left hip flex 4+/5, Left hip abd 5/5, left hip ext 4+/5  4. Patient will improve FOTO score to 74 to demonstrate return to prior level of function and to improve patients ability to perform household and work duties with greater ease.             PN: 42    ASSESSMENT/RECOMMENDATIONS: Prior to yesterday, patient was feeling significantly better with all activity and was preparing for discharge this visit. Patient reports onset of low back pain at midline to right low back beginning last night insidiously and has worsened over the day.  Prior to assessment today, she demonstrated improved lifting mechanics, improved left hip strength, and improved transfers. Today, she presents fearful of most activity due to increased discomfort and demonstrates impaired bed mobility and pain with supine positioning. Exercises altered today to minimize discomfort and improve symptoms. Provided updated HEP today to minimize discomfort and discussed use of hot pack for symptom management. Discussed continuing skilled PT 1-2x/week for 4 weeks to progress into improved functional mobility and for patient to manage symptoms with greater ease and confidence. [x]Continue therapy per initial plan/protocol at a frequency of  1-2 x per week for 4 weeks  []Continue therapy with the following recommended changes:_____________________      _____________________________________________________________________  []Discontinue therapy progressing towards or have reached established goals  []Discontinue therapy due to lack of appreciable progress towards goals  []Discontinue therapy due to lack of attendance or compliance  []Await Physician's recommendations/decisions regarding therapy  []Other:________________________________________________________________    Thank you for this referral.    Jesus Alberto Aguilar, PT, DPT 3/23/2021 12:10 PM  NOTE TO PHYSICIAN:  [address] AND   FAX TO [facility]: [phone]  If you are unable to process this request in 24 hours please contact our office: [phone]    ? I have read the above report and request that my patient continue as recommended. ? I have read the above report and request that my patient continue therapy with the following changes/special instructions:__________________________________________________________  ? I have read the above report and request that my patient be discharged from therapy.     Physicians signature: ______________________________Date: ______Time:______     Ingrid Keen NP

## 2021-03-23 NOTE — PROGRESS NOTES
PT DAILY TREATMENT NOTE     Patient Name: Gustabo Mayen  Date:3/23/2021  : 1980  [x]  Patient  Verified  Payor: ARON / Plan: Sophie Landa / Product Type: Amber Mejia /    In time:1202  Out time:1248  Total Treatment Time (min): 46  Visit #: 8 of 8    Treatment Area: Low back pain [M54.5]    SUBJECTIVE  Pain Level (0-10 scale): 4-5  Any medication changes, allergies to medications, adverse drug reactions, diagnosis change, or new procedure performed?: [x] No    [] Yes (see summary sheet for update)  Subjective functional status/changes:   [] No changes reported  Patient reports she was doing well until last night when her low back started bothering her and now it has progressed. She reports no change in normal activity to cause increase in pain. She reports taking Aleve this morning to assist with pain slightly.      OBJECTIVE    Modality rationale: decrease pain and increase tissue extensibility to improve the patients ability to perform functional mobility with greater ease    Min Type Additional Details    [] Estim:  []Unatt       []IFC  []Premod                        []Other:  []w/ice   []w/heat  Position:  Location:    [] Estim: []Att    []TENS instruct  []NMES                    []Other:  []w/US   []w/ice   []w/heat  Position:  Location:    []  Traction: [] Cervical       []Lumbar                       [] Prone          []Supine                       []Intermittent   []Continuous Lbs:  [] before manual  [] after manual    []  Ultrasound: []Continuous   [] Pulsed                           []1MHz   []3MHz W/cm2:  Location:    []  Iontophoresis with dexamethasone         Location: [] Take home patch   [] In clinic   10 []  Ice     [x]  heat  []  Ice massage  []  Laser   []  Anodyne Position: seated   Location:lumbar     []  Laser with stim  []  Other:  Position:  Location:    []  Vasopneumatic Device Pressure:       [] lo [] med [] hi   Temperature: [] lo [] med [] hi   [] Skin assessment post-treatment:  []intact []redness- no adverse reaction    []redness  adverse reaction:     38 min Therapeutic Exercise:  [x] See flow sheet : review of updated HEP, reassessment of goals    Rationale: increase ROM, increase strength and improve coordination to improve the patients ability to perform ADLs and self care tasks with greater ease     8 min Manual Therapy:  Prone - TPR to right QL, DTM to right glutes and paraspinals    The manual therapy interventions were performed at a separate and distinct time from the therapeutic activities interventions. Rationale: decrease pain, increase ROM and increase tissue extensibility to perform functional mobility           With   [] TE   [] TA   [] neuro   [] other: Patient Education: [x] Review HEP    [] Progressed/Changed HEP based on:   [] positioning   [] body mechanics   [] transfers   [] heat/ice application    [] other:      Other Objective/Functional Measures: sacral distraction increases pain, sacral compression decreases pain; significant hypertonicity noted through right QL and glutes      Pain Level (0-10 scale) post treatment: 3    ASSESSMENT/Changes in Function: Prior to yesterday, patient was feeling significantly better with all activity and was preparing for discharge this visit. Patient reports onset of low back pain at midline to right low back beginning last night insidiously and has worsened over the day. Prior to assessment today, she demonstrated improved lifting mechanics, improved left hip strength, and improved transfers. Today, she presents fearful of most activity due to increased discomfort and demonstrates impaired bed mobility and pain with supine positioning. Exercises altered today to minimize discomfort and improve symptoms. Provided updated HEP today to minimize discomfort and discussed use of hot pack for symptom management.  Discussed continuing skilled PT 1-2x/week for 4 weeks to progress into improved functional mobility and for patient to manage symptoms with greater ease and confidence. Patient will continue to benefit from skilled PT services to modify and progress therapeutic interventions, address functional mobility deficits, address ROM deficits, address strength deficits, analyze and address soft tissue restrictions, analyze and cue movement patterns, analyze and modify body mechanics/ergonomics, assess and modify postural abnormalities, address imbalance/dizziness and instruct in home and community integration to attain remaining goals. []  See Plan of Care  [x]  See progress note/recertification  []  See Discharge Summary         Progress towards goals / Updated goals:  Short Term Goals: To be accomplished in 2 weeks:  1. Patient will report performance of home exercise program 4 of 7 days in the next week demonstrating compliance to therapy program and progress towards independent management of symptoms.             Eval: HEP provided              KAREEM: Met, reports compliance. 3/2/2021  2. Patient will report no hesitations with transfers or quick change in positions associated with work or daily activities to improve functional mobility.               Eval: hesitates with transfers and fearful of movement              Current: Continued guarding with transfers or quick change in positions. Pt hesitates secondary to feeling like she will aggravate symptoms.  3/16/2021     Long Term Goals: To be accomplished in 4 weeks:  1. Patient will demonstrate proper lifting mechanics without VC or hesitation for a 20# box to return to prior level of function and full work duties.                Eval: 15# box with mini squat and lifting with back slightly               Current: 15\" from floor on 4\" step 3/09/2021  2. Patient will report at least 75% improvement in overall symptoms to improve overall quality of life and allow patient to return to recreational activities.                Eval: 0%              Current: Progressing, ~50%. 3/16/2021   3. Patient will increase left hip strength globally to 5/5 for improved stair negation, improved functional mobility, and improved ability to return to exercise program.               Eval: left hip flexion strength 4+/5, left hip abduction strength 4+/5, left hip extension strength 3+/5 with pain               Current: Progressing 3/18/21 left hip flex 4+/5, Left hip abd 5/5, left hip ext 4+/5  4. Patient will improve FOTO score to 74 to demonstrate return to prior level of function and to improve patients ability to perform household and work duties with greater ease.              Eval: 56    Current: Regressed 3/23/2021 - 42     PLAN  [x]  Upgrade activities as tolerated     []  Continue plan of care  []  Update interventions per flow sheet       []  Discharge due to:_  []  Other:_      Teri Gayatri PT, DPT 3/23/2021  12:04 PM    No future appointments.

## 2021-03-29 ENCOUNTER — APPOINTMENT (OUTPATIENT)
Dept: PHYSICAL THERAPY | Age: 41
End: 2021-03-29
Payer: OTHER GOVERNMENT

## 2021-03-31 ENCOUNTER — HOSPITAL ENCOUNTER (OUTPATIENT)
Dept: PHYSICAL THERAPY | Age: 41
Discharge: HOME | End: 2021-03-31
Payer: OTHER GOVERNMENT

## 2021-03-31 PROCEDURE — 97140 MANUAL THERAPY 1/> REGIONS: CPT

## 2021-03-31 PROCEDURE — 97110 THERAPEUTIC EXERCISES: CPT

## 2021-03-31 NOTE — PROGRESS NOTES
PT DAILY TREATMENT NOTE     Patient Name: Joon Erazo  Date:3/31/2021  : 1980  [x]  Patient  Verified  Payor: ARON / Plan: Be Bustamante 74 / Product Type:  /    In time:9:02  Out time:9:45  Total Treatment Time (min): 43  Visit #: 1 of 4-8    Treatment Area: Low back pain [M54.5]    SUBJECTIVE  Pain Level (0-10 scale): 0  Any medication changes, allergies to medications, adverse drug reactions, diagnosis change, or new procedure performed?: [x] No    [] Yes (see summary sheet for update)  Subjective functional status/changes:   [] No changes reported  Pt reports she thought about what could have caused her low back pain to flair back up and after talking to her  he reminded her about her playing with her daughter on the floor in a prone position with her back hyperextended for about an hour. Pt states coming in today her low back feels better when she she is in extension and the more she flexes at the hips the more discomfort she feels. OBJECTIVE      35 min Therapeutic Exercise:  [x] See flow sheet :   Rationale: increase ROM and increase strength to improve the patients ability to perform functional task with ease. 8 min Manual Therapy: DTM to B glutes and paraspinals in right and left S/L. The manual therapy interventions were performed at a separate and distinct time from the therapeutic activities interventions. Rationale: decrease pain, increase ROM, increase tissue extensibility and decrease trigger points to improve functional mobility with ADL's.             With   [] TE   [] TA   [] neuro   [] other: Patient Education: [x] Review HEP    [] Progressed/Changed HEP based on:   [] positioning   [] body mechanics   [] transfers   [] heat/ice application    [] other:      Other Objective/Functional Measures:      Pain Level (0-10 scale) post treatment: 0    ASSESSMENT/Changes in Function:   Pt reports she was positioned in prone on the floor with a hyperextended L/S for about an hour a couple of days ago and that may be the cause of her increased low back pain from her last visit. Pt reports increased discomfort with decreased lordosis in the L/S  Noting increased discomfort with PPT's. PPT performed at about 30% with pt noting a pull in the mid L/S but notes no pain. Pt states she feels if she would have performed at a higher percentage she would have felt increasing pain. No significant increases in L/S pain provoked this session with reintroduced exercises however pt reports some increased discomfort during band walks once fatigue set in. Pt educated in the use of a rolled towel to act as a lumbar roll to help support her L/S and decrease discomfort with pt verbally reporting some relief. No pain reported post treatment. Patient will continue to benefit from skilled PT services to modify and progress therapeutic interventions, address functional mobility deficits, address ROM deficits, address strength deficits, analyze and address soft tissue restrictions, analyze and cue movement patterns, analyze and modify body mechanics/ergonomics and assess and modify postural abnormalities to attain remaining goals. [x]  See Plan of Care  []  See progress note/recertification  []  See Discharge Summary         Progress towards goals / Updated goals:  Updated Goals: to be achieved in 4 weeks:  1. Patient will demonstrate proper lifting mechanics without VC or hesitation for a 20# box to return to prior level of function and full work duties.                PN: 15\" from floor on 4\" step   2. Patient will report at least 75% improvement in overall symptoms to improve overall quality of life and allow patient to return to recreational activities.                PN: ~50%   3.  Patient will increase left hip strength globally to 5/5 for improved stair negation, improved functional mobility, and improved ability to return to exercise program.               PN: left hip flex 4+/5, Left hip abd 5/5, left hip ext 4+/5  4.  Patient will improve FOTO score to 74 to demonstrate return to prior level of function and to improve patients ability to perform household and work duties with greater ease.             PN: 42       PLAN  []  Upgrade activities as tolerated     [x]  Continue plan of care  []  Update interventions per flow sheet       []  Discharge due to:_  []  Other:_      Jessica December, ABDIRAHMAN 3/31/2021  8:58 AM    Future Appointments   Date Time Provider Melvin Steiner   3/31/2021  9:00 AM Russ Rivera PTA [facility]   4/6/2021 12:00 PM Andrea Arizmendi [facility]   4/8/2021  1:00 PM Milady Shields PTA [facility]   4/13/2021 12:00 PM Andrea Arizmendi [facility]   4/15/2021  1:45 PM Evelyn Mcbride [facility]   4/20/2021 12:00 PM Evelyn Mcbride [facility]   4/22/2021  1:00 PM Milady Shields PTA [facility]

## 2021-04-06 ENCOUNTER — HOSPITAL ENCOUNTER (OUTPATIENT)
Dept: PHYSICAL THERAPY | Age: 41
Discharge: HOME | End: 2021-04-06
Payer: OTHER GOVERNMENT

## 2021-04-06 PROCEDURE — 97110 THERAPEUTIC EXERCISES: CPT

## 2021-04-06 PROCEDURE — 97140 MANUAL THERAPY 1/> REGIONS: CPT

## 2021-04-06 NOTE — PROGRESS NOTES
PT DAILY TREATMENT NOTE     Patient Name: Romeo Villasenor  Date:2021  : 1980  [x]  Patient  Verified  Payor:  / Plan: Be Bustamante 74 / Product Type:  /    In time: 4733 Out time:1255  Total Treatment Time (min): 52  Visit #: 2 of 4-8    Treatment Area: Low back pain [M54.5]    SUBJECTIVE  Pain Level (0-10 scale): 2  Any medication changes, allergies to medications, adverse drug reactions, diagnosis change, or new procedure performed?: [x] No    [] Yes (see summary sheet for update)  Subjective functional status/changes:   [] No changes reported  Patient reports she is doing much better overall. She reports feeling better after last session as well.      OBJECTIVE    Modality rationale: decrease pain and increase tissue extensibility to improve the patients ability to perform functional mobility with greater ease    Min Type Additional Details    [] Estim:  []Unatt       []IFC  []Premod                        []Other:  []w/ice   []w/heat  Position:  Location:    [] Estim: []Att    []TENS instruct  []NMES                    []Other:  []w/US   []w/ice   []w/heat  Position:  Location:    []  Traction: [] Cervical       []Lumbar                       [] Prone          []Supine                       []Intermittent   []Continuous Lbs:  [] before manual  [] after manual    []  Ultrasound: []Continuous   [] Pulsed                           []1MHz   []3MHz W/cm2:  Location:    []  Iontophoresis with dexamethasone         Location: [] Take home patch   [] In clinic   10 []  Ice     [x]  heat  []  Ice massage  []  Laser   []  Anodyne Position: prone with pillow under abdomen  Location: low back     []  Laser with stim  []  Other:  Position:  Location:    []  Vasopneumatic Device Pressure:       [] lo [] med [] hi   Temperature: [] lo [] med [] hi   [] Skin assessment post-treatment:  []intact []redness- no adverse reaction    []redness  adverse reaction:     34 min Therapeutic Exercise:  [x] See flow sheet :   Rationale: increase ROM, increase strength and improve coordination to improve the patients ability to perform ADLs and self care tasks with greater ease     8 min Manual Therapy:  Prone - STM/DTM to right paraspinals, bilateral QL, and bilateral glutes (right > left)    The manual therapy interventions were performed at a separate and distinct time from the therapeutic activities interventions. Rationale: decrease pain, increase ROM and increase tissue extensibility to perform functional mobility with improved ease        With   [] TE   [] TA   [] neuro   [] other: Patient Education: [x] Review HEP    [] Progressed/Changed HEP based on:   [] positioning   [] body mechanics   [] transfers   [] heat/ice application    [] other:         Pain Level (0-10 scale) post treatment: 0    ASSESSMENT/Changes in Function: Patient with improved tolerance to posterior pelvic tilt today. Addition of single leg RDLs reintroduced today with no onset of pain, however does demonstrate difficulty with balance. Reintroduced squat lifts with weight today which was well tolerated with good form and no increased symptoms reported. Patient reporting fatigue with addition of [employer] today. Education provided on initiating older exercises (more challenging exercises before most recent exacerbation) to further challenge stabilization and determine tolerance. Continue progressing core stabilization and strength as tolerated to minimize discomfort through low back impacting functional mobility and daily activities.      Patient will continue to benefit from skilled PT services to modify and progress therapeutic interventions, address functional mobility deficits, address ROM deficits, address strength deficits, analyze and address soft tissue restrictions, analyze and cue movement patterns, analyze and modify body mechanics/ergonomics, assess and modify postural abnormalities, address imbalance/dizziness and instruct in home and community integration to attain remaining goals. [x]  See Plan of Care  []  See progress note/recertification  []  See Discharge Summary         Progress towards goals / Updated goals:  Updated Goals: to be achieved in 4 weeks:  1. Patient will demonstrate proper lifting mechanics without VC or hesitation for a 20# box to return to prior level of function and full work duties.                PN: 15\" from floor on 4\" step    Current: Progressing 4/6/2021 - 10 squats with 12# KB taps   2. Patient will report at least 75% improvement in overall symptoms to improve overall quality of life and allow patient to return to recreational activities.                PN: ~50%   3. Patient will increase left hip strength globally to 5/5 for improved stair negation, improved functional mobility, and improved ability to return to exercise program.               PN: left hip flex 4+/5, Left hip abd 5/5, left hip ext 4+/5  4.  Patient will improve FOTO score to 74 to demonstrate return to prior level of function and to improve patients ability to perform household and work duties with greater ease.             TK: 42    PLAN  [x]  Upgrade activities as tolerated     []  Continue plan of care  []  Update interventions per flow sheet       []  Discharge due to:_  []  Other:_      Cyndi Higginbotham, PT, DPT 4/6/2021  12:07 PM    Future Appointments   Date Time Provider Melvin Steiner   4/8/2021  1:00 PM Mara Hubbard PTA [facility]   4/13/2021 12:00 PM Ubaldo Jimenez [facility]   4/15/2021  1:45 PM Tati Dunlap [facility]   4/20/2021 12:00 PM Tati Dunlap [facility]   4/22/2021  1:00 PM Mara Hubbard PTA [facility]

## 2021-04-08 ENCOUNTER — HOSPITAL ENCOUNTER (OUTPATIENT)
Dept: PHYSICAL THERAPY | Age: 41
Discharge: HOME | End: 2021-04-08
Payer: OTHER GOVERNMENT

## 2021-04-08 PROCEDURE — 97112 NEUROMUSCULAR REEDUCATION: CPT

## 2021-04-08 PROCEDURE — 97110 THERAPEUTIC EXERCISES: CPT

## 2021-04-08 PROCEDURE — 97140 MANUAL THERAPY 1/> REGIONS: CPT

## 2021-04-08 NOTE — PROGRESS NOTES
PT DAILY TREATMENT NOTE     Patient Name: Christina Arenas  Date:2021  : 1980  [x]  Patient  Verified  Payor:  / Plan: [facility] REGION / Product Type:  /    In time:12:58  Out time:1:35  Total Treatment Time (min): 37  Visit #: 3 of -8    Treatment Area: Low back pain [M54.5]    SUBJECTIVE  Pain Level (0-10 scale): 0/10  Any medication changes, allergies to medications, adverse drug reactions, diagnosis change, or new procedure performed?: [x] No    [] Yes (see summary sheet for update)  Subjective functional status/changes:   [] No changes reported  \"Back is doing okay today but I'm not feeling the best, I think it's all the allergy medicine I'm taking. \"    OBJECTIVE    Modality rationale: decrease pain to improve the patients ability to perform ADL's.    Min Type Additional Details    [] Estim:  []Unatt       []IFC  []Premod                        []Other:  []w/ice   []w/heat  Position:  Location:    [] Estim: []Att    []TENS instruct  []NMES                    []Other:  []w/US   []w/ice   []w/heat  Position:  Location:    []  Traction: [] Cervical       []Lumbar                       [] Prone          []Supine                       []Intermittent   []Continuous Lbs:  [] before manual  [] after manual    []  Ultrasound: []Continuous   [] Pulsed                           []1MHz   []3MHz W/cm2:  Location:    []  Iontophoresis with dexamethasone         Location: [] Take home patch   [] In clinic   5 [x]  Ice     []  heat  []  Ice massage  []  Laser   []  Anodyne Position:  Supine with wedge  Location: Low back    []  Laser with stim  []  Other:  Position:  Location:    []  Vasopneumatic Device Pressure:       [] lo [] med [] hi   Temperature: [] lo [] med [] hi   [] Skin assessment post-treatment:  []intact []redness- no adverse reaction    []redness  adverse reaction:     14 min Therapeutic Exercise:  [x] See flow sheet :   Rationale: increase ROM and increase strength to improve the patients ability to perform ADL's. 10 min Neuromuscular Re-education:  [x]  See flow sheet : BOSU step ups, shuttle balance squats and hip 3-way. Rationale: increase strength, improve coordination and increase proprioception  to improve the patients ability to perform functional activities. 8 min Manual Therapy:  STM/DTM (B) L/S paraspinals and QL. MFR (B) L/S paraspinals. Pt prone. The manual therapy interventions were performed at a separate and distinct time from the therapeutic activities interventions. Rationale: decrease pain, increase ROM, increase tissue extensibility and decrease trigger points to improve ease of performing functional activities. With   [x] TE   [] TA   [] neuro   [] other: Patient Education: [x] Review HEP    [] Progressed/Changed HEP based on:   [] positioning   [] body mechanics   [] transfers   [] heat/ice application    [] other:      Other Objective/Functional Measures: Held several exercises today secondary to pt not feeling well, reports taking a three different allergy meds. Pain Level (0-10 scale) post treatment: 0/10    ASSESSMENT/Changes in Function: Performed exercises well without complaints of pain however unable to fully participate secondary to not feeling well. Ended treatment early. Reassess next visit. Patient will continue to benefit from skilled PT services to modify and progress therapeutic interventions, address functional mobility deficits, address ROM deficits, address strength deficits, analyze and address soft tissue restrictions, analyze and cue movement patterns and analyze and modify body mechanics/ergonomics to attain remaining goals. [x]  See Plan of Care  []  See progress note/recertification  []  See Discharge Summary         Progress towards goals / Updated goals:  Updated Goals: to be achieved in 4 weeks:  1.  Patient will demonstrate proper lifting mechanics without VC or hesitation for a 20# box to return to prior level of function and full work duties.                PN: 15\" from floor on 4\" step               Current: Progressing 4/6/2021 - 10 squats with 12# KB taps   2. Patient will report at least 75% improvement in overall symptoms to improve overall quality of life and allow patient to return to recreational activities.                PN: ~50%   3. Patient will increase left hip strength globally to 5/5 for improved stair negation, improved functional mobility, and improved ability to return to exercise program.               PN: left hip flex 4+/5, Left hip abd 5/5, left hip ext 4+/5  4.  Patient will improve FOTO score to 74 to demonstrate return to prior level of function and to improve patients ability to perform household and work duties with greater ease.              PN: 42    PLAN  []  Upgrade activities as tolerated     [x]  Continue plan of care  []  Update interventions per flow sheet       []  Discharge due to:_  []  Other:_      Elzbieta Joseph PTA 4/8/2021  12:59 PM    Future Appointments   Date Time Provider Melvin Steiner   4/8/2021  1:00 PM Claudean Brunt, PTA [facility]   4/13/2021 12:00 PM Cherelle Ayoub [facility]   4/15/2021  1:45 PM Dana [facility]   4/20/2021 12:00 PM Dana Robledo [facility]   4/22/2021  1:00 PM Claudean Brunt, PTA [facility]

## 2021-04-13 ENCOUNTER — HOSPITAL ENCOUNTER (OUTPATIENT)
Dept: PHYSICAL THERAPY | Age: 41
Discharge: HOME | End: 2021-04-13
Payer: OTHER GOVERNMENT

## 2021-04-13 PROCEDURE — 97140 MANUAL THERAPY 1/> REGIONS: CPT

## 2021-04-13 PROCEDURE — 97112 NEUROMUSCULAR REEDUCATION: CPT

## 2021-04-13 PROCEDURE — 97110 THERAPEUTIC EXERCISES: CPT

## 2021-04-13 NOTE — PROGRESS NOTES
PT DAILY TREATMENT NOTE     Patient Name: Christine Her  Date:2021  : 1980  [x]  Patient  Verified  Payor:  / Plan: [facility] / Product Type:  /    In time: 3036  Out time:1245  Total Treatment Time (min): 44  Visit #: 4 of 4-8    Treatment Area: Low back pain [M54.5]    SUBJECTIVE  Pain Level (0-10 scale): 0  Any medication changes, allergies to medications, adverse drug reactions, diagnosis change, or new procedure performed?: [x] No    [] Yes (see summary sheet for update)  Subjective functional status/changes:   [] No changes reported  Patient reports she has been performing exercises at home and bought some therabands to continue progressing with no back pain present. OBJECTIVE    16 min Therapeutic Exercise:  [x] See flow sheet :   Rationale: increase ROM, increase strength and improve coordination to improve the patients ability to perform ADLs and self care tasks with greater ease     20 min Neuromuscular Re-education:  [x]  See flow sheet: shuttle balance squats and 3 way hip, dynamic step ups on bosu ball, core stabilization with SB in supine, Farmer's carry, PPT series in supine   Rationale: increase strength, improve coordination, improve balance and increase proprioception  to improve the patients ability to perform functional mobility with improved stabilization     8 min Manual Therapy:  Prone - STM/DTM to bilateral lumbar paraspinals, left QL, MFR to bilateral paraspinals    The manual therapy interventions were performed at a separate and distinct time from the therapeutic activities interventions.   Rationale: decrease pain, increase ROM and increase tissue extensibility to perform functional mobility        With   [] TE   [] TA   [] neuro   [] other: Patient Education: [x] Review HEP    [] Progressed/Changed HEP based on:   [] positioning   [] body mechanics   [] transfers   [] heat/ice application    [] other:      Other Objective/Functional Measures: left hip flex 5/5, Left hip abd 5/5, left hip ext 5/5     Pain Level (0-10 scale) post treatment: 0    ASSESSMENT/Changes in Function: Patient tolerating all therex well today without onset of low back pain. Progressed weight with Farmer's Carry, added [place] pull downs with marching, and increased repetitions with PPT series all tolerated well. Patient demonstrating full 5/5 MMT strength to left hip with no pain present throughout formal testing. Recommend patient decreasing frequency to 1x/week and anticipate discharge in the next visit to [facility]. Patient will continue to benefit from skilled PT services to modify and progress therapeutic interventions, address functional mobility deficits, address ROM deficits, address strength deficits, analyze and address soft tissue restrictions, analyze and cue movement patterns, analyze and modify body mechanics/ergonomics, assess and modify postural abnormalities, address imbalance/dizziness and instruct in home and community integration to attain remaining goals. [x]  See Plan of Care  []  See progress note/recertification  []  See Discharge Summary         Progress towards goals / Updated goals:  Updated Goals: to be achieved in 4 weeks:  1. Patient will demonstrate proper lifting mechanics without VC or hesitation for a 20# box to return to prior level of function and full work duties.                PN: 15\" from floor on 4\" step               Current: Progressing 4/6/2021 - 10 squats with 12# KB taps   2. Patient will report at least 75% improvement in overall symptoms to improve overall quality of life and allow patient to return to recreational activities.                PN: ~50%   3.  Patient will increase left hip strength globally to 5/5 for improved stair negation, improved functional mobility, and improved ability to return to exercise program.               PN: left hip flex 4+/5, Left hip abd 5/5, left hip ext 4+/5   Current: Met 4/13/2021 - left hip flex 5/5, Left hip abd 5/5, left hip ext 5/5  4.  Patient will improve FOTO score to 74 to demonstrate return to prior level of function and to improve patients ability to perform household and work duties with greater ease.              PN: 42    PLAN  [x]  Upgrade activities as tolerated     []  Continue plan of care  []  Update interventions per flow sheet       []  Discharge due to:_  []  Other:_      Franko Fowler PT, DPT 4/13/2021  12:10 PM    Future Appointments   Date Time Provider Melvin Steiner   4/15/2021  1:45 PM Reyna Tariq [facility]   4/20/2021 12:00 PM Norman Shepard [facility]   4/22/2021  1:00 PM Filiberto Candelaria PTA [facility]

## 2021-04-15 ENCOUNTER — APPOINTMENT (OUTPATIENT)
Dept: PHYSICAL THERAPY | Age: 41
End: 2021-04-15
Payer: OTHER GOVERNMENT

## 2021-04-20 ENCOUNTER — APPOINTMENT (OUTPATIENT)
Dept: PHYSICAL THERAPY | Age: 41
End: 2021-04-20
Payer: OTHER GOVERNMENT

## 2021-04-22 ENCOUNTER — HOSPITAL ENCOUNTER (OUTPATIENT)
Dept: PHYSICAL THERAPY | Age: 41
Discharge: HOME | End: 2021-04-22
Payer: OTHER GOVERNMENT

## 2021-04-22 PROCEDURE — 97110 THERAPEUTIC EXERCISES: CPT

## 2021-04-22 PROCEDURE — 97112 NEUROMUSCULAR REEDUCATION: CPT

## 2021-04-22 NOTE — PROGRESS NOTES
[facility]  [address]  [place], [address]  [phone] fax    Physical Therapy Discharge Summary  Patient name: Aditya Prior Start of Care: 2021   Referral source: Osvaldo Montano NP : 1980   Medical/Treatment Diagnosis: Low back pain [M54.5]  Payor: ARON / Plan: [facility] / Product Type: [address] [place] [address] /  Onset Date:2021     Prior Hospitalization: see medical history Provider#: [id number]   Medications: Verified on Patient Summary List    Comorbidities: allergies, back pain   Prior Level of Function:no limitations, working out on bike 3x/week   Visits from Start of Care: 13    Missed Visits: 2  Reporting Period : 3/23/2021 to 2021      Summary of Care:    FOTO 94%. Pt reports 100% subjective overall improvement since IE. Pt has made considerable progress since IE, met LTG's. Pt had no further questions, comments or concerns. Progress towards goals:  1. Patient will demonstrate proper lifting mechanics without VC or hesitation for a 20# box to return to prior level of function and full work duties.                PN: 15\" from floor on 4\" step               Current: Progressing - 10 squats with 12# KB taps   2. Patient will report at least 75% improvement in overall symptoms to improve overall quality of life and allow patient to return to recreational activities.                PN: ~50%              Current: Met, 100%. 3. Patient will increase left hip strength globally to 5/5 for improved stair negation, improved functional mobility, and improved ability to return to exercise program.               PN: left hip flex 4+/5, Left hip abd 5/5, left hip ext 4+/5              Current: Met - left hip flex 5/5, Left hip abd 5/5, left hip ext 5/5  4.  Patient will improve FOTO score to 74 to demonstrate return to prior level of function and to improve patients ability to perform household and work duties with greater ease.              PN: 42              Current: Met, 94%.       ASSESSMENT/RECOMMENDATIONS:  [x]Discontinue therapy: [x]Patient has reached or is progressing toward set goals      []Patient is non-compliant or has abdicated      []Due to lack of appreciable progress towards set goals    Carl Peralta, ABDIRAHMAN 4/22/2021 1:54 PM    Co-sign: Cathi Barajas PT, DPT 4/29/2021 10:14 AM

## 2021-04-22 NOTE — PROGRESS NOTES
PT DAILY TREATMENT NOTE     Patient Name: Liyah Conte  Date:2021  : 1980  [x]  Patient  Verified  Payor:  [facility] / Product Type: [facility] /    In time:1:00  Out time:1:31  Total Treatment Time (min): 31  Visit #: 5 of 4-8    Treatment Area: Low back pain [M54.5]    SUBJECTIVE  Pain Level (0-10 scale): 0/10  Any medication changes, allergies to medications, adverse drug reactions, diagnosis change, or new procedure performed?: [x] No    [] Yes (see summary sheet for update)  Subjective functional status/changes:   [] No changes reported  \"Haven't had any trouble. \"    OBJECTIVE    13 min Therapeutic Exercise:  [x] See flow sheet :   Rationale: increase ROM and increase strength to improve the patients ability to perform ADL's. 18 min Neuromuscular Re-education:  [x]  See flow sheet : BOSU step ups, shuttle balance squats and hip 3-way, core stabs, band-walks, RDL's. Rationale: increase strength, improve coordination and increase proprioception  to improve the patients ability to perform functional activities. With   [x] TE   [] TA   [] neuro   [] other: Patient Education: [x] Review HEP    [] Progressed/Changed HEP based on:   [] positioning   [] body mechanics   [] transfers   [] heat/ice application    [] other:      Other Objective/Functional Measures: FOTO 94%. Pt reports 100% subjective overall improvement since IE. Pt has made considerable progress since IE, met LTG's. Pt had no further questions, comments or concerns. Pain Level (0-10 scale) post treatment: 0/10    ASSESSMENT/Changes in Function:      []  See Plan of Care  []  See progress note/recertification  [x]  See Discharge Summary         Progress towards goals / Updated goals:  Updated Goals: to be achieved in 4 weeks:  1.  Patient will demonstrate proper lifting mechanics without VC or hesitation for a 20# box to return to prior level of function and full work duties.                PN: 15\" from floor on 4\" step               Current: Progressing 4/6/2021 - 10 squats with 12# KB taps   2. Patient will report at least 75% improvement in overall symptoms to improve overall quality of life and allow patient to return to recreational activities.                PN: ~50%   Current: Met, 100%. 4/22/2021   3. Patient will increase left hip strength globally to 5/5 for improved stair negation, improved functional mobility, and improved ability to return to exercise program.               PN: left hip flex 4+/5, Left hip abd 5/5, left hip ext 4+/5              Current: Met 4/13/2021 - left hip flex 5/5, Left hip abd 5/5, left hip ext 5/5  4. Patient will improve FOTO score to 74 to demonstrate return to prior level of function and to improve patients ability to perform household and work duties with greater ease.              PN: 42   Current: Met, 94%. 4/22/2021     PLAN  []  Upgrade activities as tolerated     []  Continue plan of care  []  Update interventions per flow sheet       [x]  Discharge due to: Met LTG's, D/C to HEP.   []  Other:_      Carl Peralta PTA 4/22/2021  12:59 PM    Future Appointments   Date Time Provider Melvin Steiner   4/22/2021  1:00 PM Candido Crockett PTA [facility]

## 2024-01-03 ENCOUNTER — APPOINTMENT (OUTPATIENT)
Facility: HOSPITAL | Age: 44
End: 2024-01-03
Payer: OTHER GOVERNMENT

## 2024-01-03 ENCOUNTER — HOSPITAL ENCOUNTER (EMERGENCY)
Facility: HOSPITAL | Age: 44
Discharge: HOME | End: 2024-01-03
Payer: OTHER GOVERNMENT

## 2024-01-03 VITALS
WEIGHT: 165 LBS | HEART RATE: 74 BPM | SYSTOLIC BLOOD PRESSURE: 100 MMHG | RESPIRATION RATE: 16 BRPM | HEIGHT: 64 IN | TEMPERATURE: 97.6 F | DIASTOLIC BLOOD PRESSURE: 66 MMHG | BODY MASS INDEX: 28.17 KG/M2 | OXYGEN SATURATION: 100 %

## 2024-01-03 DIAGNOSIS — R11.0 NAUSEA: ICD-10-CM

## 2024-01-03 DIAGNOSIS — K80.20 CALCULUS OF GALLBLADDER WITHOUT CHOLECYSTITIS WITHOUT OBSTRUCTION: ICD-10-CM

## 2024-01-03 DIAGNOSIS — R10.13 ABDOMINAL PAIN, EPIGASTRIC: Primary | ICD-10-CM

## 2024-01-03 LAB
ALBUMIN SERPL-MCNC: 3.6 G/DL (ref 3.4–5)
ALBUMIN/GLOB SERPL: 0.9 (ref 0.8–1.7)
ALP SERPL-CCNC: 71 U/L (ref 45–117)
ALT SERPL-CCNC: 34 U/L (ref 13–56)
ANION GAP SERPL CALC-SCNC: 5 MMOL/L (ref 3–18)
APPEARANCE UR: CLEAR
AST SERPL-CCNC: 14 U/L (ref 10–38)
BASOPHILS # BLD: 0.1 K/UL (ref 0–0.1)
BASOPHILS NFR BLD: 1 % (ref 0–2)
BILIRUB SERPL-MCNC: 0.5 MG/DL (ref 0.2–1)
BILIRUB UR QL: NEGATIVE
BUN SERPL-MCNC: 7 MG/DL (ref 7–18)
BUN/CREAT SERPL: 7 (ref 12–20)
CALCIUM SERPL-MCNC: 8.5 MG/DL (ref 8.5–10.1)
CHLORIDE SERPL-SCNC: 106 MMOL/L (ref 100–111)
CO2 SERPL-SCNC: 29 MMOL/L (ref 21–32)
COLOR UR: YELLOW
CREAT SERPL-MCNC: 0.96 MG/DL (ref 0.6–1.3)
DIFFERENTIAL METHOD BLD: NORMAL
EOSINOPHIL # BLD: 0.3 K/UL (ref 0–0.4)
EOSINOPHIL NFR BLD: 3 % (ref 0–5)
ERYTHROCYTE [DISTWIDTH] IN BLOOD BY AUTOMATED COUNT: 13.1 % (ref 11.6–14.5)
GLOBULIN SER CALC-MCNC: 4.1 G/DL (ref 2–4)
GLUCOSE SERPL-MCNC: 92 MG/DL (ref 74–99)
GLUCOSE UR STRIP.AUTO-MCNC: NEGATIVE MG/DL
HCT VFR BLD AUTO: 41.8 % (ref 35–45)
HGB BLD-MCNC: 13.6 G/DL (ref 12–16)
HGB UR QL STRIP: NEGATIVE
IMM GRANULOCYTES # BLD AUTO: 0 K/UL (ref 0–0.04)
IMM GRANULOCYTES NFR BLD AUTO: 0 % (ref 0–0.5)
KETONES UR QL STRIP.AUTO: NEGATIVE MG/DL
LEUKOCYTE ESTERASE UR QL STRIP.AUTO: NEGATIVE
LIPASE SERPL-CCNC: 36 U/L (ref 13–75)
LYMPHOCYTES # BLD: 2.2 K/UL (ref 0.9–3.6)
LYMPHOCYTES NFR BLD: 26 % (ref 21–52)
MCH RBC QN AUTO: 28.3 PG (ref 24–34)
MCHC RBC AUTO-ENTMCNC: 32.5 G/DL (ref 31–37)
MCV RBC AUTO: 86.9 FL (ref 78–100)
MONOCYTES # BLD: 0.6 K/UL (ref 0.05–1.2)
MONOCYTES NFR BLD: 7 % (ref 3–10)
NEUTS SEG # BLD: 5.4 K/UL (ref 1.8–8)
NEUTS SEG NFR BLD: 63 % (ref 40–73)
NITRITE UR QL STRIP.AUTO: NEGATIVE
NRBC # BLD: 0 K/UL (ref 0–0.01)
NRBC BLD-RTO: 0 PER 100 WBC
PH UR STRIP: 6.5 (ref 5–8)
PLATELET # BLD AUTO: 268 K/UL (ref 135–420)
PMV BLD AUTO: 10.9 FL (ref 9.2–11.8)
POTASSIUM SERPL-SCNC: 3.7 MMOL/L (ref 3.5–5.5)
PROT SERPL-MCNC: 7.7 G/DL (ref 6.4–8.2)
PROT UR STRIP-MCNC: NEGATIVE MG/DL
RBC # BLD AUTO: 4.81 M/UL (ref 4.2–5.3)
SODIUM SERPL-SCNC: 140 MMOL/L (ref 136–145)
SP GR UR REFRACTOMETRY: <1.005 (ref 1–1.03)
UROBILINOGEN UR QL STRIP.AUTO: 0.2 EU/DL (ref 0.2–1)
WBC # BLD AUTO: 8.4 K/UL (ref 4.6–13.2)

## 2024-01-03 PROCEDURE — 83690 ASSAY OF LIPASE: CPT

## 2024-01-03 PROCEDURE — 81003 URINALYSIS AUTO W/O SCOPE: CPT

## 2024-01-03 PROCEDURE — 2580000003 HC RX 258: Performed by: PHYSICIAN ASSISTANT

## 2024-01-03 PROCEDURE — 96374 THER/PROPH/DIAG INJ IV PUSH: CPT

## 2024-01-03 PROCEDURE — 6360000002 HC RX W HCPCS: Performed by: PHYSICIAN ASSISTANT

## 2024-01-03 PROCEDURE — 96361 HYDRATE IV INFUSION ADD-ON: CPT

## 2024-01-03 PROCEDURE — 74176 CT ABD & PELVIS W/O CONTRAST: CPT

## 2024-01-03 PROCEDURE — 99284 EMERGENCY DEPT VISIT MOD MDM: CPT

## 2024-01-03 PROCEDURE — 85025 COMPLETE CBC W/AUTO DIFF WBC: CPT

## 2024-01-03 PROCEDURE — 80053 COMPREHEN METABOLIC PANEL: CPT

## 2024-01-03 RX ORDER — ONDANSETRON 2 MG/ML
4 INJECTION INTRAMUSCULAR; INTRAVENOUS
Status: COMPLETED | OUTPATIENT
Start: 2024-01-03 | End: 2024-01-03

## 2024-01-03 RX ORDER — ONDANSETRON 8 MG/1
8 TABLET, ORALLY DISINTEGRATING ORAL EVERY 8 HOURS PRN
Qty: 15 TABLET | Refills: 0 | Status: SHIPPED | OUTPATIENT
Start: 2024-01-03 | End: 2024-01-08

## 2024-01-03 RX ORDER — DICYCLOMINE HCL 20 MG
20 TABLET ORAL 4 TIMES DAILY
Qty: 40 TABLET | Refills: 0 | Status: SHIPPED | OUTPATIENT
Start: 2024-01-03 | End: 2024-01-13

## 2024-01-03 RX ORDER — 0.9 % SODIUM CHLORIDE 0.9 %
1000 INTRAVENOUS SOLUTION INTRAVENOUS ONCE
Status: COMPLETED | OUTPATIENT
Start: 2024-01-03 | End: 2024-01-03

## 2024-01-03 RX ADMIN — ONDANSETRON 4 MG: 2 INJECTION INTRAMUSCULAR; INTRAVENOUS at 11:36

## 2024-01-03 RX ADMIN — SODIUM CHLORIDE 1000 ML: 9 INJECTION, SOLUTION INTRAVENOUS at 11:36

## 2024-01-03 ASSESSMENT — PAIN SCALES - GENERAL: PAINLEVEL_OUTOF10: 8

## 2024-01-03 ASSESSMENT — ENCOUNTER SYMPTOMS
SORE THROAT: 0
RHINORRHEA: 0
DIARRHEA: 0
ABDOMINAL PAIN: 1
COUGH: 0
BACK PAIN: 0
NAUSEA: 1
CONSTIPATION: 0
VOMITING: 0
EYE DISCHARGE: 0
WHEEZING: 0
EYE REDNESS: 0
SHORTNESS OF BREATH: 0

## 2024-01-03 ASSESSMENT — LIFESTYLE VARIABLES
HOW OFTEN DO YOU HAVE A DRINK CONTAINING ALCOHOL: MONTHLY OR LESS
HOW MANY STANDARD DRINKS CONTAINING ALCOHOL DO YOU HAVE ON A TYPICAL DAY: 1 OR 2

## 2024-01-03 ASSESSMENT — PAIN - FUNCTIONAL ASSESSMENT: PAIN_FUNCTIONAL_ASSESSMENT: 0-10

## 2024-01-03 NOTE — ED NOTES
Discharge instructions reviewed with patient. Patient advised to follow up as directed on discharge instructions.  Patient denies questions, needs or concerns at this time.  Patient verbalized understanding. No s/sx of distress noted.

## 2024-01-03 NOTE — ED TRIAGE NOTES
-- DO NOT REPLY / DO NOT REPLY ALL --  -- Message is from [facility]agement Center [employer] ([facility]) --    ONLY TO BE USED WITHIN A REFILL MEDICATION ENCOUNTER    Med Refill  Is the patient currently having any symptoms?: No/Non-Emergent symptoms    Name of medication requested: See pended med    Has patient contacted the pharmacy? Yes    Is this the first request for the medication in the last 48 hours?: Yes      Patient is requesting a medication refill - medication is on active list      Full name of the provider who ordered the medication: jer dubois     [facility] site name / Account # for provider: ky garduno     Preferred Pharmacy: Pharmacy  [id number] [place] [id number]    Patient confirmed the above pharmacy as correct?  Yes      Caller Information       Type Contact Phone/Fax    05/08/2023 08:39 AM CDT Phone (Incoming) JAMIE HATFIELD (Mother) [phone] (M)          Alternative phone number: none    Can a detailed message be left?: Yes    Patient is completely out of medication: Verify if patient is currently experiencing symptoms. If patient is symptomatic, proceed with front end triage instead of medication refill. If patient is not symptomatic but is completely out of medication, jordin as High priority when routing. Inform patient: “Please call back with any questions or concerns and if your condition becomes life threatening, you should seek immediate medical assistance by calling 911 or going to the Emergency Department for evaluation.”    Inform all patients: \"If the clinical team needs to contact you regarding this refill, please be aware the return phone call may come from an unidentified or out of state phone number and your refill request will be addressed as soon as the clinical team reviews your message.\"   Ambulatory to triage with c/o intermittent periumbilical pain with waves of nausea x 2 days. States \"black\" stool this am.

## 2024-01-03 NOTE — ED PROVIDER NOTES
[facility] EMERGENCY DEPT  EMERGENCY DEPARTMENT ENCOUNTER      Pt Name: Rosalinda Faustin  Pt Age: 43 y.o.  Sex: female   MRN: [id number]  CSN: [id number]   Birthdate 1980  Date of evaluation: 1/3/2024  Provider: YULISSA JORDAN  Room: HER01/01  Time Dictated: 12:55 PM    Chief Complaint   Chief Complaint   Patient presents with    Abdominal Pain    Nausea       History of Present Illness   The history is provided by the patient. No  was used.       11:10 AM  43 y.o. female presents to the ED C/O abdominal pain with nausea since yesterday.  Patient reports that it came on all off a sudden at 10 AM yesterday and it was so bad she had to crunch in a ball and hold her stomach. She took a shower and the nausea started. This morning her stool was really dark, almost black and softer than usual with mucus in her stool. She ate oatmeal this morning with mild improvement of pain. Patient has been taking Cimetidine with no relief of the symptoms. No fever,  chills, SOB, difficulty breathing, wheezing, back pain, dysuria, frequency, urgency, hematuria, vaginal pain, vaginal discharge, vaginal bleeding, vomiting, diarrhea, or constipation.      Nursing Note reviewed    REVIEW OF SYSTEMS    (2-9 systems for level 4, 10 or more for level 5)   Review of Systems   Constitutional:  Negative for chills and fever.   HENT:  Negative for congestion, rhinorrhea and sore throat.    Eyes:  Negative for discharge and redness.   Respiratory:  Negative for cough, shortness of breath and wheezing.    Cardiovascular:  Negative for chest pain and palpitations.   Gastrointestinal:  Positive for abdominal pain and nausea. Negative for constipation, diarrhea and vomiting.   Genitourinary:  Negative for dysuria, frequency and urgency.   Musculoskeletal:  Negative for back pain, myalgias and neck pain.   Skin:  Negative for rash and wound.   Neurological:  Negative for dizziness and headaches.   Hematological:  Negative for